# Patient Record
Sex: FEMALE | Race: WHITE | NOT HISPANIC OR LATINO | Employment: FULL TIME | URBAN - METROPOLITAN AREA
[De-identification: names, ages, dates, MRNs, and addresses within clinical notes are randomized per-mention and may not be internally consistent; named-entity substitution may affect disease eponyms.]

---

## 2023-04-27 ENCOUNTER — HOSPITAL ENCOUNTER (EMERGENCY)
Facility: HOSPITAL | Age: 43
Discharge: HOME | End: 2023-04-27
Attending: EMERGENCY MEDICINE

## 2023-04-27 VITALS
BODY MASS INDEX: 24.24 KG/M2 | SYSTOLIC BLOOD PRESSURE: 126 MMHG | HEIGHT: 64 IN | WEIGHT: 142 LBS | RESPIRATION RATE: 20 BRPM | TEMPERATURE: 97.7 F | HEART RATE: 119 BPM | DIASTOLIC BLOOD PRESSURE: 84 MMHG | OXYGEN SATURATION: 99 %

## 2023-04-27 DIAGNOSIS — S51.852A DOG BITE OF LEFT FOREARM, INITIAL ENCOUNTER: Primary | ICD-10-CM
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
## 2023-04-27 RX ORDER — GINSENG 100 MG
1 CAPSULE ORAL ONCE
Status: COMPLETED | OUTPATIENT
Start: 2023-04-27 | End: 2023-04-27

## 2023-04-27 RX ORDER — AMOXICILLIN AND CLAVULANATE POTASSIUM 875; 125 MG/1; MG/1
1 TABLET, FILM COATED ORAL ONCE
Status: COMPLETED | OUTPATIENT
Start: 2023-04-27 | End: 2023-04-27

## 2023-04-27 RX ORDER — AMOXICILLIN AND CLAVULANATE POTASSIUM 875; 125 MG/1; MG/1
1 TABLET, FILM COATED ORAL EVERY 12 HOURS
Qty: 13 TABLET | Refills: 0 | Status: SHIPPED | OUTPATIENT
Start: 2023-04-27 | End: 2023-05-04

## 2023-04-27 RX ADMIN — BACITRACIN 1 SMALL APPLICATION: 500 OINTMENT TOPICAL at 18:39

## 2023-04-27 RX ADMIN — RABIES VIRUS STRAIN PM-1503-3M ANTIGEN (PROPIOLACTONE INACTIVATED) AND WATER 1 ML: KIT at 18:34

## 2023-04-27 RX ADMIN — RABIES IMMUNE GLOBULIN (HUMAN) 1290 UNITS: 300 INJECTION, SOLUTION INFILTRATION; INTRAMUSCULAR at 18:33

## 2023-04-27 RX ADMIN — AMOXICILLIN AND CLAVULANATE POTASSIUM 1 TABLET: 875; 125 TABLET, FILM COATED ORAL at 18:38

## 2023-04-27 NOTE — Clinical Note
Hallock Mayrazeus was seen and treated in our emergency department on 4/27/2023  No restrictions            Diagnosis:     Aisha Aguilar  may return to work on return date  She may return on this date: 04/29/2023         If you have any questions or concerns, please don't hesitate to call        Maureen Ch MD    ______________________________           _______________          _______________  Hospital Representative                              Date                                Time

## 2023-04-27 NOTE — ED NOTES
Alert oriented in no acute distress  Dressing to the lt lower arm no active bleeding  Pt aware of follow up   Written instructions given     Noe Dougherty RN  04/27/23 5103

## 2023-04-27 NOTE — DISCHARGE INSTRUCTIONS
You will need repeat rabies vaccines on day 3 (4/30), day 7 (5/4), and day 14 (5/11) from injury  This can be done at urgent care or in the emergency department  Keep the wounds clean  Keep it covered while it is still oozing the first day or so  Otherwise, it is okay to leave it exposed to air while at home, as oxygen helps wounds heal   Cover it with topical antibiotic ointment (bacitracin, Neosporin, triple antibiotic ointment) and a bandage when you are doing anything where it might get dirty  Clean it with soap and water, and pat the area dry  The wound will become slightly pink and swollen as it heals, however you should be seen sooner if you develop signs of infection, including significant redness, swelling, drainage of pus, or for any other concerns

## 2023-04-27 NOTE — ED PROVIDER NOTES
"History  Chief Complaint   Patient presents with   • Dog Bite     Patient co dog bite to arm  Bleeding controlled  Patient states \"I was breaking up a dog fight  \"      HPI    None       History reviewed  No pertinent past medical history  Past Surgical History:   Procedure Laterality Date   •  SECTION         • HERNIA REPAIR         History reviewed  No pertinent family history  I have reviewed and agree with the history as documented  E-Cigarette/Vaping   • E-Cigarette Use Never User      E-Cigarette/Vaping Substances     Social History     Tobacco Use   • Smoking status: Every Day     Packs/day: 0 25     Types: Cigarettes   • Smokeless tobacco: Never   Vaping Use   • Vaping Use: Never used   Substance Use Topics   • Alcohol use: Yes     Comment: social   • Drug use: Not Currently       Review of Systems    Physical Exam  Physical Exam  Vitals and nursing note reviewed  Constitutional:       General: She is not in acute distress  Appearance: She is well-developed  HENT:      Head: Normocephalic and atraumatic  Eyes:      Conjunctiva/sclera: Conjunctivae normal       Pupils: Pupils are equal, round, and reactive to light  Neck:      Trachea: No tracheal deviation  Cardiovascular:      Rate and Rhythm: Regular rhythm  Tachycardia present  Pulses:           Radial pulses are 2+ on the left side  Pulmonary:      Effort: Pulmonary effort is normal  No respiratory distress  Musculoskeletal:      Left forearm: Tenderness present  No deformity or bony tenderness  Left wrist: No tenderness  Normal range of motion  Left hand: Normal sensation  Normal capillary refill  Arms:       Cervical back: Normal range of motion  Skin:     General: Skin is warm and dry  Neurological:      Mental Status: She is alert and oriented to person, place, and time  GCS: GCS eye subscore is 4  GCS verbal subscore is 5  GCS motor subscore is 6     Psychiatric:         Behavior: " Behavior normal          Vital Signs  ED Triage Vitals   Temperature Pulse Respirations Blood Pressure SpO2   04/27/23 1651 04/27/23 1651 04/27/23 1651 04/27/23 1652 04/27/23 1651   97 7 °F (36 5 °C) (!) 125 18 145/95 98 %      Temp src Heart Rate Source Patient Position - Orthostatic VS BP Location FiO2 (%)   -- 04/27/23 1651 04/27/23 1822 04/27/23 1822 --    Monitor Sitting Right arm       Pain Score       --                  Vitals:    04/27/23 1651 04/27/23 1652 04/27/23 1822   BP:  145/95 126/84   Pulse: (!) 125  (!) 119   Patient Position - Orthostatic VS:   Sitting         Visual Acuity      ED Medications  Medications   amoxicillin-clavulanate (AUGMENTIN) 875-125 mg per tablet 1 tablet (1 tablet Oral Given 4/27/23 1838)   rabies vaccine, human diploid IM injection 1 mL (1 mL Intramuscular Given 4/27/23 1834)   rabies immune globulin, human (HyperRAB) injection 1,290 Units (1,290 Units Infiltration Given 4/27/23 1833)   bacitracin topical ointment 1 small application (1 small application Topical Given 4/27/23 1839)       Diagnostic Studies  Results Reviewed     None                 No orders to display              Procedures  Procedures         ED Course                               SBIRT 20yo+    Flowsheet Row Most Recent Value   Initial Alcohol Screen: US AUDIT-C     1  How often do you have a drink containing alcohol? 1 Filed at: 04/27/2023 1654   2  How many drinks containing alcohol do you have on a typical day you are drinking? 0 Filed at: 04/27/2023 1654   3b  FEMALE Any Age, or MALE 65+: How often do you have 4 or more drinks on one occassion? 1 Filed at: 04/27/2023 1654   Audit-C Score 2 Filed at: 04/27/2023 1654   INDER: How many times in the past year have you    Used an illegal drug or used a prescription medication for non-medical reasons?  Never Filed at: 04/27/2023 1654                    Medical Decision Making  This is a 29-year-old left-hand-dominant female who presents here today after "a dog bite to her left arm  She says her dogs were outside when a strange dog who seemed \"violent\" came over and started to attack her dogs  She broke them up, and got bit several times on her left arm  She cleaned it with Dial soap, covered it with Neosporin and a bandage  She says her tetanus is up-to-date  She endorses pain at the area  She denies any other areas of injury  She is uncertain of who the dog belongs to or its rabies status  She did try to go to urgent care but they told her they could not treat her there appropriately  Review of systems: Otherwise negative unless stated as above    She is well-appearing, in no acute distress  She has multiple puncture wounds to the forearm  There are some bruising along the ulnar side  She has tenderness to the soft tissues but no focal bony tenderness  She has mild oozing of blood-tinged serous fluid from one of the wounds but no gross hemorrhage  She has mild pain in the forearm with movement of the wrist but is able to do so  She is neurovascular intact distally  She is tachycardic but attributes this to anxiety from being in the emergency department  Exam is otherwise unremarkable  Wound was cleaned with water and chlorhexidine soap  I am not concerned about underlying bony injury  We will give immunoglobulin and start rabies vaccine series, and start her on antibiotics  I discussed with her continued management at home, follow-up, and indications for return, and she expresses understanding with this plan  Dog bite of left forearm, initial encounter: acute illness or injury  Risk  OTC drugs  Prescription drug management  Disposition  Final diagnoses:   Dog bite of left forearm, initial encounter     Time reflects when diagnosis was documented in both MDM as applicable and the Disposition within this note     Time User Action Codes Description Comment    4/27/2023  6:17 PM Sandra Fitzgerald Add [U61 044G,  W54  0XXA] " Dog bite of left forearm, initial encounter       ED Disposition     ED Disposition   Discharge    Condition   Good    Date/Time   Thu Apr 27, 2023 5686 [address] discharge to home/self care  Follow-up Information     Follow up With Specialties Details Why Contact Info Additional Information    [facility] Go in 3 days for repeat rabies vaccine [address] Now [facility], [address] [facility], [address] Emergency Department Emergency Medicine Go in 3 days for repeat rabies vaccine [address] [facility] [address] [facility] Emergency Department, [address] [place], [place], [address]    Susy Larsen MD  Schedule an appointment as soon as possible for a visit  As needed, to follow up on your arm [address]  [phone]             Patient's Medications   Discharge Prescriptions    AMOXICILLIN-CLAVULANATE (AUGMENTIN) 875-125 MG PER TABLET    Take 1 tablet by mouth every 12 (twelve) hours for 7 days       Start Date: 4/27/2023 End Date: 5/4/2023       Order Dose: 1 tablet       Quantity: 13 tablet    Refills: 0       No discharge procedures on file      PDMP Review     None          ED Provider  Electronically Signed by           Maureen Ch MD  04/28/23 1046

## 2023-04-30 ENCOUNTER — HOSPITAL ENCOUNTER (EMERGENCY)
Facility: HOSPITAL | Age: 43
Discharge: HOME | End: 2023-04-30
Attending: EMERGENCY MEDICINE

## 2023-04-30 VITALS
OXYGEN SATURATION: 99 % | RESPIRATION RATE: 16 BRPM | SYSTOLIC BLOOD PRESSURE: 140 MMHG | TEMPERATURE: 98.3 F | HEART RATE: 115 BPM | DIASTOLIC BLOOD PRESSURE: 95 MMHG

## 2023-04-30 DIAGNOSIS — Z23 NEED FOR PROPHYLACTIC VACCINATION AND INOCULATION AGAINST RABIES: Primary | ICD-10-CM

## 2023-04-30 RX ADMIN — RABIES VIRUS STRAIN PM-1503-3M ANTIGEN (PROPIOLACTONE INACTIVATED) AND WATER 1 ML: KIT at 17:01

## 2023-04-30 NOTE — ED PROVIDER NOTES
History  Chief Complaint   Patient presents with    Follow Up Rabies     Per pt she is here for the second round of her rabies vaccine  History provided by:  Patient  Medical Problem  Location:  Left wrist  Quality:  Bite  Severity:  Moderate  Onset quality:  Sudden  Duration:  3 days  Timing:  Constant  Progression:  Improving  Chronicity:  New  Context:  Pt got bite by dog trying to break up fight between her dog and another  On abx and got rabies, swelling and wounds improving, here for a rabies shot and wound check  Relieved by:  Abx and wearing a split  Worsened by:  Nothing  Ineffective treatments:  None  Associated symptoms: no fever and no rash        Prior to Admission Medications   Prescriptions Last Dose Informant Patient Reported? Taking?   amoxicillin-clavulanate (AUGMENTIN) 875-125 mg per tablet   No No   Sig: Take 1 tablet by mouth every 12 (twelve) hours for 7 days      Facility-Administered Medications: None       No past medical history on file  Past Surgical History:   Procedure Laterality Date     SECTION          HERNIA REPAIR         No family history on file  I have reviewed and agree with the history as documented  E-Cigarette/Vaping    E-Cigarette Use Never User      E-Cigarette/Vaping Substances     Social History     Tobacco Use    Smoking status: Every Day     Packs/day: 0 25     Types: Cigarettes    Smokeless tobacco: Never   Vaping Use    Vaping Use: Never used   Substance Use Topics    Alcohol use: Yes     Comment: social    Drug use: Not Currently       Review of Systems   Constitutional: Negative for fever  Skin: Negative for rash  All other systems reviewed and are negative  Physical Exam  Physical Exam  Vitals reviewed  HENT:      Head: Normocephalic and atraumatic  Cardiovascular:      Rate and Rhythm: Normal rate     Pulmonary:      Effort: Pulmonary effort is normal    Musculoskeletal:         General: Signs of injury (left wrist with few puncture marks, without signicant redness or drainage, some swelling to general area pt states is improved) present  No swelling  Neurological:      Mental Status: She is alert  Vital Signs  ED Triage Vitals [04/30/23 1652]   Temperature Pulse Respirations Blood Pressure SpO2   98 3 °F (36 8 °C) (!) 115 16 140/95 99 %      Temp Source Heart Rate Source Patient Position - Orthostatic VS BP Location FiO2 (%)   Temporal Monitor Sitting Left arm --      Pain Score       --           Vitals:    04/30/23 1652   BP: 140/95   Pulse: (!) 115   Patient Position - Orthostatic VS: Sitting         Visual Acuity      ED Medications  Medications   rabies vaccine, human diploid IM injection 1 mL (1 mL Intramuscular Given 4/30/23 1701)       Diagnostic Studies  Results Reviewed     None                 No orders to display              Procedures  Procedures         ED Course                               SBIRT 20yo+    Flowsheet Row Most Recent Value   Initial Alcohol Screen: US AUDIT-C     1  How often do you have a drink containing alcohol? 0 Filed at: 04/30/2023 1653   2  How many drinks containing alcohol do you have on a typical day you are drinking? 0 Filed at: 04/30/2023 1653   3b  FEMALE Any Age, or MALE 65+: How often do you have 4 or more drinks on one occassion? 0 Filed at: 04/30/2023 1653   Audit-C Score 0 Filed at: 04/30/2023 1653   INDER: How many times in the past year have you    Used an illegal drug or used a prescription medication for non-medical reasons? Never Filed at: 04/30/2023 1653                    Medical Decision Making  Booster rabies, return for f/u rabies, continue abx, NSAIDS, RICE and splint for now  Need for prophylactic vaccination and inoculation against rabies: acute illness or injury  Risk  Prescription drug management            Disposition  Final diagnoses:   Need for prophylactic vaccination and inoculation against rabies     Time reflects when diagnosis was documented in both MDM as applicable and the Disposition within this note     Time User Action Codes Description Comment    4/30/2023  4:49 PM Karina LUNDBERG Add [Z23] Need for prophylactic vaccination and inoculation against rabies       ED Disposition     ED Disposition   Discharge    Condition   Stable    Date/Time   Sun Apr 30, 2023  4:47 PM    [address] discharge to home/self care  Follow-up Information     Follow up With Specialties Details Why Contact Info    Isatu Mars MD    [address] [place] [address]  N  [phone]            Discharge Medication List as of 4/30/2023  4:49 PM      CONTINUE these medications which have NOT CHANGED    Details   amoxicillin-clavulanate (AUGMENTIN) 875-125 mg per tablet Take 1 tablet by mouth every 12 (twelve) hours for 7 days, Starting Thu 4/27/2023, Until Thu 5/4/2023, Print             No discharge procedures on file      PDMP Review     None          ED Provider  Electronically Signed by           Astrid Nolen MD  05/02/23 2297

## 2023-05-04 ENCOUNTER — HOSPITAL ENCOUNTER (EMERGENCY)
Facility: HOSPITAL | Age: 43
Discharge: HOME | End: 2023-05-04
Attending: EMERGENCY MEDICINE

## 2023-05-04 VITALS
RESPIRATION RATE: 18 BRPM | SYSTOLIC BLOOD PRESSURE: 139 MMHG | HEART RATE: 109 BPM | DIASTOLIC BLOOD PRESSURE: 95 MMHG | OXYGEN SATURATION: 96 % | TEMPERATURE: 98.4 F

## 2023-05-04 DIAGNOSIS — Z23 ENCOUNTER FOR ADMINISTRATION OF VACCINE: Primary | ICD-10-CM

## 2023-05-04 RX ADMIN — RABIES VIRUS STRAIN PM-1503-3M ANTIGEN (PROPIOLACTONE INACTIVATED) AND WATER 1 ML: KIT at 16:52

## 2023-05-04 NOTE — ED PROVIDER NOTES
Pt Name: Aquilla Skiff  MRN: [id number]  [place] 1980  Age/Sex: 44 y o  female  Date of evaluation: 2023  PCP: Laron Singleton MD    [address] [place] [address]    Chief Complaint   Patient presents with    Follow Up Rabies     Pt arrives for vaccine number 3          HPI    44 y o  female presenting with request for third rabies vaccine  Patient states she was bitten by a dog approximately 1 week ago, states that the wound appears to be healing well  She had her previous 2 rabies vaccines without significant adverse effects  She denies any symptoms at this time  HPI      Past Medical and Surgical History    No past medical history on file  Past Surgical History:   Procedure Laterality Date     SECTION          HERNIA REPAIR         No family history on file  Social History     Tobacco Use    Smoking status: Every Day     Packs/day: 0 25     Types: Cigarettes    Smokeless tobacco: Never   Vaping Use    Vaping Use: Never used   Substance Use Topics    Alcohol use: Yes     Comment: social    Drug use: Not Currently           Allergies    No Known Allergies    Home Medications    Prior to Admission medications    Medication Sig Start Date End Date Taking? Authorizing Provider   amoxicillin-clavulanate (AUGMENTIN) 875-125 mg per tablet Take 1 tablet by mouth every 12 (twelve) hours for 7 days 23  Eloy Hood MD           Review of Systems    Review of Systems   Constitutional: Negative for activity change, chills and fever  HENT: Negative for drooling and facial swelling  Eyes: Negative for pain, discharge and visual disturbance  Respiratory: Negative for apnea, cough, chest tightness, shortness of breath and wheezing  Cardiovascular: Negative for chest pain and leg swelling  Gastrointestinal: Negative for abdominal pain, constipation, diarrhea, nausea and vomiting  Genitourinary: Negative for difficulty urinating, dysuria and urgency  Musculoskeletal: Negative for arthralgias, back pain and gait problem  Skin: Negative for color change and rash  Neurological: Negative for dizziness, speech difficulty, weakness and headaches  Psychiatric/Behavioral: Negative for agitation, behavioral problems and confusion  All other systems reviewed and negative  Physical Exam      ED Triage Vitals [05/04/23 1649]   Temperature Pulse Respirations Blood Pressure SpO2   98 4 °F (36 9 °C) (!) 109 18 139/95 96 %      Temp src Heart Rate Source Patient Position - Orthostatic VS BP Location FiO2 (%)   -- Monitor Sitting Left arm --      Pain Score       --               Physical Exam  Vitals and nursing note reviewed  Constitutional:       General: She is not in acute distress  Appearance: She is well-developed  She is not ill-appearing or toxic-appearing  HENT:      Head: Normocephalic and atraumatic  Right Ear: External ear normal       Left Ear: External ear normal       Nose: Nose normal  No congestion or rhinorrhea  Mouth/Throat:      Mouth: Mucous membranes are moist       Pharynx: Oropharynx is clear  Eyes:      Conjunctiva/sclera: Conjunctivae normal       Pupils: Pupils are equal, round, and reactive to light  Cardiovascular:      Rate and Rhythm: Normal rate and regular rhythm  Pulses: Normal pulses  Pulmonary:      Effort: Pulmonary effort is normal  No respiratory distress  Abdominal:      General: There is no distension  Musculoskeletal:         General: No deformity  Normal range of motion  Cervical back: Normal range of motion and neck supple  No rigidity  Comments: Healing puncture wounds consistent with reported dog bite noted to left forearm, clean dry and intact without surrounding erythema, pain out of proportion, discharge, or any other concerning findings  Strength sensation pulse and cap refill intact distal    Skin:     General: Skin is warm and dry        Capillary Refill: Capillary refill takes less than 2 seconds  Findings: No erythema or rash  Neurological:      Mental Status: She is alert and oriented to person, place, and time  Psychiatric:         Behavior: Behavior normal          Thought Content: Thought content normal          Judgment: Judgment normal               Diagnostic Results      Labs:    Results Reviewed     None          All labs reviewed and utilized in the medical decision making process    Radiology:    No orders to display       All radiology studies independently viewed by me and interpreted by the radiologist     Procedure    Procedures        ED Course of Care and Re-Assessments      Patient given rabies vaccine    Medications   rabies vaccine, human diploid IM injection 1 mL (1 mL Intramuscular Given 5/4/23 4034)           FINAL IMPRESSION    Final diagnoses:   Encounter for administration of vaccine         DISPOSITION/PLAN    Presentation as above for repeat administration of rabies vaccine  Vital signs remarkable for initial tachycardia felt to be situational and resolved at time of my exam, examination overall reassuring with wound from dog bite healing well without evidence of infection at this time  Given repeat dose of vaccine, counseled regarding next dose in 1 week  Discharged with strict return precautions  Time reflects when diagnosis was documented in both MDM as applicable and the Disposition within this note     Time User Action Codes Description Comment    5/4/2023  4:47 PM Amanda Willams Add [Z23] Encounter for administration of vaccine       ED Disposition     ED Disposition   Discharge    Condition   --    Date/Time   Thu May 4, 2023  4:50 PM    [address] discharge to home/self care                 Follow-up Information     Follow up With Specialties Details Why Contact Info Additional [address] [facility] Emergency Department Emergency Medicine Go to  Return on 11 May for the fourth "and final dose of your rabies vaccine series 34 [facility] [address] [facility], [address] [place], [place], [address]    Terrence Crenshaw MD  Call  As needed [address] [address] [facility] [address]  [address]  [phone]               PATIENT REFERRED TO:    [address] [facility] Emergency Department  34 [facility] [address] [phone]  Go to   Return on 11 May for the fourth and final dose of your rabies vaccine series    Terrence Crenshaw MD  [address] [address] [facility] [address]  [phone]    Call   As needed      DISCHARGE MEDICATIONS:    Discharge Medication List as of 5/4/2023  4:47 PM      CONTINUE these medications which have NOT CHANGED    Details   amoxicillin-clavulanate (AUGMENTIN) 875-125 mg per tablet Take 1 tablet by mouth every 12 (twelve) hours for 7 days, Starting Thu 4/27/2023, Until Thu 5/4/2023, Print             No discharge procedures on file  Gail Dakin, MD    Portions of the record may have been created with voice recognition software  Occasional wrong word or \"sound alike\" substitutions may have occurred due to the inherent limitations of voice recognition software    Please read the chart carefully and recognize, using context, where substitutions have occurred     Gail Dakin, MD  05/05/23 0870    "

## 2023-05-12 ENCOUNTER — HOSPITAL ENCOUNTER (EMERGENCY)
Facility: HOSPITAL | Age: 43
Discharge: HOME | End: 2023-05-12
Attending: EMERGENCY MEDICINE | Admitting: EMERGENCY MEDICINE

## 2023-05-12 VITALS
OXYGEN SATURATION: 97 % | SYSTOLIC BLOOD PRESSURE: 155 MMHG | HEART RATE: 98 BPM | DIASTOLIC BLOOD PRESSURE: 98 MMHG | TEMPERATURE: 98.3 F | RESPIRATION RATE: 18 BRPM

## 2023-05-12 DIAGNOSIS — Z23 NEED FOR IMMUNIZATION AGAINST RABIES: Primary | ICD-10-CM

## 2023-05-12 RX ADMIN — RABIES VIRUS STRAIN PM-1503-3M ANTIGEN (PROPIOLACTONE INACTIVATED) AND WATER 1 ML: KIT at 14:06

## 2023-05-12 NOTE — ED PROVIDER NOTES
History  Chief Complaint   Patient presents with   • Follow Up Rabies     Here for last rabies shot     43 yo female here for last rabies shot  No other medical concerns  Cannot display prior to admission medications because the patient has not been admitted in this contact  History reviewed  No pertinent past medical history  Past Surgical History:   Procedure Laterality Date   •  SECTION         • HERNIA REPAIR         History reviewed  No pertinent family history  I have reviewed and agree with the history as documented  E-Cigarette/Vaping   • E-Cigarette Use Never User      E-Cigarette/Vaping Substances     Social History     Tobacco Use   • Smoking status: Every Day     Packs/day: 0 25     Types: Cigarettes   • Smokeless tobacco: Never   Vaping Use   • Vaping Use: Never used   Substance Use Topics   • Alcohol use: Yes     Comment: social   • Drug use: Not Currently       Review of Systems   Constitutional: Negative for chills and fever  Skin: Negative for wound  Physical Exam  Physical Exam  Vitals and nursing note reviewed  Constitutional:       General: She is not in acute distress  Appearance: Normal appearance  She is well-developed  She is not ill-appearing, toxic-appearing or diaphoretic  HENT:      Head: Normocephalic and atraumatic  Eyes:      General:         Right eye: No discharge  Left eye: No discharge  Conjunctiva/sclera: Conjunctivae normal       Pupils: Pupils are equal, round, and reactive to light  Neck:      Vascular: No JVD  Pulmonary:      Effort: Pulmonary effort is normal  No respiratory distress  Breath sounds: No stridor  Musculoskeletal:         General: No tenderness or deformity  Normal range of motion  Cervical back: Normal range of motion and neck supple  Skin:     General: Skin is warm and dry  Capillary Refill: Capillary refill takes less than 2 seconds     Neurological:      General: No focal deficit present  Mental Status: She is alert and oriented to person, place, and time  Cranial Nerves: No cranial nerve deficit  Sensory: No sensory deficit  Motor: No weakness or abnormal muscle tone  Coordination: Coordination normal          Vital Signs  ED Triage Vitals [05/12/23 1326]   Temperature Pulse Respirations Blood Pressure SpO2   98 3 °F (36 8 °C) 98 18 155/98 97 %      Temp Source Heart Rate Source Patient Position - Orthostatic VS BP Location FiO2 (%)   Oral Monitor Sitting Left arm --      Pain Score       No Pain           Vitals:    05/12/23 1326   BP: 155/98   Pulse: 98   Patient Position - Orthostatic VS: Sitting         Visual Acuity      ED Medications  Medications   rabies vaccine, human diploid IM injection 1 mL (has no administration in time range)       Diagnostic Studies  Results Reviewed     None                 No orders to display              Procedures  Procedures         ED Course                                             Medical Decision Making  Need for immunization against rabies: acute illness or injury  Risk  Prescription drug management  Disposition  Final diagnoses:   Need for immunization against rabies     Time reflects when diagnosis was documented in both MDM as applicable and the Disposition within this note     Time User Action Codes Description Comment    5/12/2023  1:27 PM León Gallito Add [Z23] Need for immunization against rabies       ED Disposition     ED Disposition   Discharge    Condition   Stable    Date/Time   Fri May 12, 2023  1:27 PM    Comment   Milagros Chen discharge to home/self care  Follow-up Information    None         Patient's Medications    No medications on file       No discharge procedures on file      PDMP Review     None          ED Provider  Electronically Signed by           Yfn Kaplan MD  05/12/23 2053

## 2023-05-12 NOTE — Clinical Note
Giovana Dejesus was seen and treated in our emergency department on 5/12/2023  Diagnosis:     Isabell French  may return to work on return date  She may return on this date: 05/12/2023    Seen in ER this afternoon      If you have any questions or concerns, please don't hesitate to call        Ruben Kim RN    ______________________________           _______________          _______________  Hospital Representative                              Date                                Time

## 2023-05-12 NOTE — Clinical Note
Zuleymacampos Cordova was seen and treated in our emergency department on 5/12/2023  Diagnosis:     Jose E Jacobo  may return to work on return date  She may return on this date: 05/12/2023    Seen in ER      If you have any questions or concerns, please don't hesitate to call        Maria Fernanda Flannery MD    ______________________________           _______________          _______________  Hospital Representative                              Date                                Time